# Patient Record
Sex: FEMALE | Race: BLACK OR AFRICAN AMERICAN | NOT HISPANIC OR LATINO | Employment: FULL TIME | URBAN - METROPOLITAN AREA
[De-identification: names, ages, dates, MRNs, and addresses within clinical notes are randomized per-mention and may not be internally consistent; named-entity substitution may affect disease eponyms.]

---

## 2018-05-24 DIAGNOSIS — E03.9 HYPOTHYROIDISM, UNSPECIFIED TYPE: Primary | ICD-10-CM

## 2018-05-24 RX ORDER — LEVOTHYROXINE SODIUM 88 UG/1
88 TABLET ORAL DAILY
Qty: 30 TABLET | Refills: 2 | Status: SHIPPED | OUTPATIENT
Start: 2018-05-24 | End: 2018-08-16 | Stop reason: SDUPTHER

## 2018-08-16 DIAGNOSIS — E03.9 HYPOTHYROIDISM, UNSPECIFIED TYPE: ICD-10-CM

## 2018-08-16 RX ORDER — LEVOTHYROXINE SODIUM 88 UG/1
TABLET ORAL
Qty: 30 TABLET | Refills: 2 | Status: SHIPPED | OUTPATIENT
Start: 2018-08-16 | End: 2018-11-05 | Stop reason: SDUPTHER

## 2018-11-05 DIAGNOSIS — E03.9 HYPOTHYROIDISM, UNSPECIFIED TYPE: ICD-10-CM

## 2018-11-05 RX ORDER — LEVOTHYROXINE SODIUM 88 UG/1
TABLET ORAL
Qty: 30 TABLET | Refills: 2 | Status: SHIPPED | OUTPATIENT
Start: 2018-11-05 | End: 2019-01-25 | Stop reason: SDUPTHER

## 2019-01-25 ENCOUNTER — OFFICE VISIT (OUTPATIENT)
Dept: ENDOCRINOLOGY | Facility: HOSPITAL | Age: 59
End: 2019-01-25
Payer: COMMERCIAL

## 2019-01-25 VITALS
BODY MASS INDEX: 30.91 KG/M2 | HEIGHT: 62 IN | HEART RATE: 88 BPM | WEIGHT: 168 LBS | SYSTOLIC BLOOD PRESSURE: 118 MMHG | DIASTOLIC BLOOD PRESSURE: 72 MMHG

## 2019-01-25 DIAGNOSIS — E04.2 MULTIPLE THYROID NODULES: ICD-10-CM

## 2019-01-25 DIAGNOSIS — E03.9 HYPOTHYROIDISM, UNSPECIFIED TYPE: ICD-10-CM

## 2019-01-25 DIAGNOSIS — E89.2 STATUS POST PARATHYROIDECTOMY (HCC): ICD-10-CM

## 2019-01-25 DIAGNOSIS — E03.9 ACQUIRED HYPOTHYROIDISM: Primary | ICD-10-CM

## 2019-01-25 DIAGNOSIS — Z86.39 H/O HYPERPARATHYROIDISM: ICD-10-CM

## 2019-01-25 PROCEDURE — 99215 OFFICE O/P EST HI 40 MIN: CPT | Performed by: INTERNAL MEDICINE

## 2019-01-25 RX ORDER — VITAMIN B COMPLEX
1000 TABLET ORAL DAILY
COMMUNITY

## 2019-01-25 RX ORDER — ZOLPIDEM TARTRATE 10 MG/1
10 TABLET ORAL
COMMUNITY

## 2019-01-25 RX ORDER — PANTOPRAZOLE SODIUM 40 MG/1
40 TABLET, DELAYED RELEASE ORAL DAILY
COMMUNITY

## 2019-01-25 RX ORDER — LEVOTHYROXINE SODIUM 88 UG/1
88 TABLET ORAL DAILY
Qty: 30 TABLET | Refills: 11 | Status: SHIPPED | OUTPATIENT
Start: 2019-01-25 | End: 2020-01-28 | Stop reason: SDUPTHER

## 2019-01-25 RX ORDER — ALUMINUM ZIRCONIUM OCTACHLOROHYDREX GLY 16 G/100G
1 GEL TOPICAL DAILY
COMMUNITY

## 2019-01-25 NOTE — PROGRESS NOTES
1/25/2019    Assessment/Plan      Diagnoses and all orders for this visit:    Acquired hypothyroidism  -     TSH, 3rd generation Lab Collect; Future  -     T4, free Lab Collect; Future  -     Thyroid Antibodies Panel Lab Collect; Future    Multiple thyroid nodules  -     TSH, 3rd generation Lab Collect; Future  -     T4, free Lab Collect; Future  -     Thyroid Antibodies Panel Lab Collect; Future    H/O hyperparathyroidism  -     Phosphorus Lab Collect; Future  -     Comprehensive metabolic panel Lab Collect; Future    Status post parathyroidectomy [facility])  -     Phosphorus Lab Collect; Future  -     Comprehensive metabolic panel Lab Collect; Future    Hypothyroidism, unspecified type  -     levothyroxine 88 mcg tablet; Take 1 tablet (88 mcg total) by mouth daily    Other orders  -     pantoprazole (PROTONIX) 40 mg tablet; Take 40 mg by mouth daily  -     zolpidem (AMBIEN) 10 mg tablet; Take 10 mg by mouth daily at bedtime as needed for sleep  -     Cholecalciferol (VITAMIN D) 2000 units tablet; Take 2,000 Units by mouth daily  -     bifidobacterium infantis (ALIGN) capsule; Take 1 capsule by mouth daily        Assessment/Plan:  1  Hypothyroidism  Most recent thyroid function tests are normal   I would have her continue the same levothyroxine 88 mcg daily  She asked if she could discontinue it since she is having some sleeping problems  I reassured her that I do not think her thyroid is causing any sleeping abnormalities with normal thyroid blood work and that she should not stop her thyroid hormone  2  Multiple small thyroid nodules on ultrasound  They are subcentimeter non worrisome  These will be followed over time  3  History of hyperparathyroidism post parathyroid adenoma removal   Most recent calcium levels are normal   She has not had a recurrence of the hyperparathyroidism  She will continue to take vitamin-D recur supplementation on a daily basis    I have asked her to follow up in 1 year with preceding CMP, phosphorus, TSH, free T4, and thyroid antibody panel  CC:  Hypothyroid and hyperparathyroid follow-up    History of Present Illness     HPI: Kameron Wallace is a 60y o  year old female with history of hypothyroidism with multiple small thyroid nodules and history of primary hyperparathyroidism post left parathyroid adenoma removal in the past   She was found to have primary hyperparathyroidism in 2014 and during the workup a parathyroid adenoma in the left lower lobe was discovered and a DEXA bone scan demonstrated normal bone mineral density  She underwent parathyroid adenoma removal and calcium has been normal since  She had had significant confusion when the calcium was high and that had improved  She continued to be fatigued and was subsequently found to have hypothyroidism around 6702-8562  She was started on thyroid hormone  She is on levothyroxine 88 mcg daily  She is always somewhat cold  She denies heat intolerance or tremors  She will get heart racing when she is very fatigued and does feel fatigued a lot  She denies diarrhea or constipation  She denies anxiety or depression  She tosses and turns at night has insomnia and often only gets several hours of sleep despite Ambien usage  Weight has been stable  She has winter dry skin, but no brittle nails or hair loss  She has no history of head or neck irradiation in the past   She has no diplopia or difficulties with swallowing  She denies polyuria or polydipsia  She has no perioral numbness or tingling or numbness or tingling of the extremities tremor knees  She has no muscle twitches or spasms  She does have weakness in her right shoulder or arm  She cannot lift her right shoulder up or extended above her head utilizing its own muscle power  She actually has to lift it with her left arm  She is due to see an orthopedist within the next week for evaluation      Review of Systems   Constitutional: Positive for fatigue  Negative for unexpected weight change  Will get very fatigued and heart races  HENT: Negative for hearing loss, tinnitus and trouble swallowing  Eyes: Negative for visual disturbance  No diplopia  Wears glasses  Has dry eyes  Respiratory: Positive for shortness of breath  Negative for chest tightness  SOB and heart racing going up the steps  Cardiovascular: Positive for palpitations  Negative for chest pain and leg swelling  Gastrointestinal: Positive for abdominal distention and abdominal pain  Negative for constipation, diarrhea and nausea  Has heartburn and reflux symptoms  Endocrine: Positive for cold intolerance  Negative for heat intolerance, polydipsia and polyuria  Always cold  Musculoskeletal: Positive for arthralgias  Negative for back pain  Can only lift right arm only slightly and not above shoulder  No pain, just can't lift it  Baker's cyst posterior left knee  Skin: Negative for rash  Has dry skin in the winter  No brittle nails  No hair loss  Neurological: Negative for dizziness, tremors, light-headedness, numbness and headaches  No perioral paresthesia  Psychiatric/Behavioral: Positive for sleep disturbance  Negative for confusion and dysphoric mood  The patient is not nervous/anxious  Has insomnia, only sleeps 2 hours  Will toss and turn if off ambien         Historical Information   Past Medical History:   Diagnosis Date    GERD (gastroesophageal reflux disease)      Past Surgical History:   Procedure Laterality Date     SECTION      EYE SURGERY      left 3 times and right 1 time for lasar surggery in corners for dry eye    PARATHYROIDECTOMY / EXPLORATION OF PARATHYROIDS       Social History   History   Alcohol Use    Yes     Comment: occasional     History   Drug Use No     History   Smoking Status    Never Smoker   Smokeless Tobacco    Never Used     Family History:   Family History Problem Relation Age of Onset    Hypertension Mother     Stroke Father     Leukemia Brother     Hypertension Sister     No Known Problems Brother     No Known Problems Daughter     No Known Problems Sister     No Known Problems Sister     No Known Problems Sister     Depression Sister        Meds/Allergies   Current Outpatient Prescriptions   Medication Sig Dispense Refill    bifidobacterium infantis (ALIGN) capsule Take 1 capsule by mouth daily      Cholecalciferol (VITAMIN D) 2000 units tablet Take 2,000 Units by mouth daily      levothyroxine 88 mcg tablet Take 1 tablet (88 mcg total) by mouth daily 30 tablet 11    pantoprazole (PROTONIX) 40 mg tablet Take 40 mg by mouth daily      zolpidem (AMBIEN) 10 mg tablet Take 10 mg by mouth daily at bedtime as needed for sleep       No current facility-administered medications for this visit  Allergies   Allergen Reactions    Milk-Related Compounds Other (See Comments)     Acid reflux and abdominal  Bloating   Penicillins Other (See Comments)     Not given due to sister's bad reaction       Objective   Vitals: Blood pressure 118/72, pulse 88, height 5' 2" (1 575 m), weight 76 2 kg (168 lb)  Invasive Devices          No matching active lines, drains, or airways          Physical Exam   Constitutional: She is oriented to person, place, and time  She appears well-developed and well-nourished  HENT:   Head: Normocephalic and atraumatic  Negative Chvostek sign  Eyes: Pupils are equal, round, and reactive to light  Conjunctivae and EOM are normal    No lid lag, stare, proptosis, or periorbital edema  Neck: Normal range of motion  Neck supple  No thyromegaly present  Healed anterior neck scar  No palpable thyroid nodules  No palpable masses  No bruits over the thyroid gland or carotids  Cardiovascular: Normal rate, regular rhythm, normal heart sounds and intact distal pulses  No murmur heard    Pulmonary/Chest: Effort normal and breath sounds normal  She has no wheezes  Abdominal: Soft  Bowel sounds are normal  There is no tenderness  Musculoskeletal: She exhibits no edema or deformity  Can't raise right arm up on own  Can lift it with the help of the left arm  No tremor of the outstretched hands  No spinous process tenderness  No CVA tenderness  Lymphadenopathy:     She has no cervical adenopathy  Neurological: She is alert and oriented to person, place, and time  She has normal reflexes  Skin: Skin is warm and dry  No rash noted  Vitals reviewed  The history was obtained from the review of the chart, [facility] endocrinology notes, and from the patient  Lab Results:   Blood work done at [facility] on 01/02/2019 demonstrates a CMP with a glucose of 93, BUN 11, creatinine 0 89, which GFR 71  Calcium was 9 4 with an albumin of 4 5  Phosphorus was 3 6  TSH is 0 676 with a free T4 of 1 77  Thyroid ultrasound done a [facility] on 06/28/2018 demonstrates the right lobe of the thyroid measuring 2 9 x 1 4 x 1 1 cm in the left lobe of the thyroid measuring 2 7 x 1 5 x 1 1 cm  The gland is at atrophic and inhomogeneous in echogenicity with a 5 mm solid nodule in the upper pole of the left lobe and a 5 mm nodule in the inferior posterior pole of the left lobe along with an 8 mm nodule in the thyroid isthmus  No results found for this or any previous visit (from the past 85933 hour(s))        Future Appointments  Date Time Provider Francisco Temple   1/28/2020 9:20 AM Chaparrita Aguayo MD [facility]

## 2019-01-25 NOTE — LETTER
January 25, 2019     Zaire Montalvo MD  [address] [facility]    Patient: Jimmy Landa   YOB: 1960   Date of Visit: 1/25/2019       Dear Dr Rajni Butler:    Thank you for referring Jimmy Landa to me for evaluation  Below are my notes for this consultation  If you have questions, please do not hesitate to call me  I look forward to following your patient along with you  Sincerely,        Donato Vela MD        CC: No Recipients  Donato Vela MD  1/25/2019 12:40 PM  Sign at close encounter  1/25/2019    Assessment/Plan      Diagnoses and all orders for this visit:    Acquired hypothyroidism  -     TSH, 3rd generation Lab Collect; Future  -     T4, free Lab Collect; Future  -     Thyroid Antibodies Panel Lab Collect; Future    Multiple thyroid nodules  -     TSH, 3rd generation Lab Collect; Future  -     T4, free Lab Collect; Future  -     Thyroid Antibodies Panel Lab Collect; Future    H/O hyperparathyroidism  -     Phosphorus Lab Collect; Future  -     Comprehensive metabolic panel Lab Collect; Future    Status post parathyroidectomy [facility])  -     Phosphorus Lab Collect; Future  -     Comprehensive metabolic panel Lab Collect; Future    Hypothyroidism, unspecified type  -     levothyroxine 88 mcg tablet; Take 1 tablet (88 mcg total) by mouth daily    Other orders  -     pantoprazole (PROTONIX) 40 mg tablet; Take 40 mg by mouth daily  -     zolpidem (AMBIEN) 10 mg tablet; Take 10 mg by mouth daily at bedtime as needed for sleep  -     Cholecalciferol (VITAMIN D) 2000 units tablet; Take 2,000 Units by mouth daily  -     bifidobacterium infantis (ALIGN) capsule; Take 1 capsule by mouth daily        Assessment/Plan:  1  Hypothyroidism  Most recent thyroid function tests are normal   I would have her continue the same levothyroxine 88 mcg daily  She asked if she could discontinue it since she is having some sleeping problems    I reassured her that I do not think her thyroid is causing any sleeping abnormalities with normal thyroid blood work and that she should not stop her thyroid hormone  2  Multiple small thyroid nodules on ultrasound  They are subcentimeter non worrisome  These will be followed over time  3  History of hyperparathyroidism post parathyroid adenoma removal   Most recent calcium levels are normal   She has not had a recurrence of the hyperparathyroidism  She will continue to take vitamin-D recur supplementation on a daily basis  I have asked her to follow up in 1 year with preceding CMP, phosphorus, TSH, free T4, and thyroid antibody panel  CC:  Hypothyroid and hyperparathyroid follow-up    History of Present Illness     HPI: Kameron Wallace is a 60y o  year old female with history of hypothyroidism with multiple small thyroid nodules and history of primary hyperparathyroidism post left parathyroid adenoma removal in the past   She was found to have primary hyperparathyroidism in 2014 and during the workup a parathyroid adenoma in the left lower lobe was discovered and a DEXA bone scan demonstrated normal bone mineral density  She underwent parathyroid adenoma removal and calcium has been normal since  She had had significant confusion when the calcium was high and that had improved  She continued to be fatigued and was subsequently found to have hypothyroidism around 1570-2260  She was started on thyroid hormone  She is on levothyroxine 88 mcg daily  She is always somewhat cold  She denies heat intolerance or tremors  She will get heart racing when she is very fatigued and does feel fatigued a lot  She denies diarrhea or constipation  She denies anxiety or depression  She tosses and turns at night has insomnia and often only gets several hours of sleep despite Ambien usage  Weight has been stable  She has winter dry skin, but no brittle nails or hair loss    She has no history of head or neck irradiation in the past   She has no diplopia or difficulties with swallowing  She denies polyuria or polydipsia  She has no perioral numbness or tingling or numbness or tingling of the extremities tremor knees  She has no muscle twitches or spasms  She does have weakness in her right shoulder or arm  She cannot lift her right shoulder up or extended above her head utilizing its own muscle power  She actually has to lift it with her left arm  She is due to see an orthopedist within the next week for evaluation  Review of Systems   Constitutional: Positive for fatigue  Negative for unexpected weight change  Will get very fatigued and heart races  HENT: Negative for hearing loss, tinnitus and trouble swallowing  Eyes: Negative for visual disturbance  No diplopia  Wears glasses  Has dry eyes  Respiratory: Positive for shortness of breath  Negative for chest tightness  SOB and heart racing going up the steps  Cardiovascular: Positive for palpitations  Negative for chest pain and leg swelling  Gastrointestinal: Positive for abdominal distention and abdominal pain  Negative for constipation, diarrhea and nausea  Has heartburn and reflux symptoms  Endocrine: Positive for cold intolerance  Negative for heat intolerance, polydipsia and polyuria  Always cold  Musculoskeletal: Positive for arthralgias  Negative for back pain  Can only lift right arm only slightly and not above shoulder  No pain, just can't lift it  Baker's cyst posterior left knee  Skin: Negative for rash  Has dry skin in the winter  No brittle nails  No hair loss  Neurological: Negative for dizziness, tremors, light-headedness, numbness and headaches  No perioral paresthesia  Psychiatric/Behavioral: Positive for sleep disturbance  Negative for confusion and dysphoric mood  The patient is not nervous/anxious  Has insomnia, only sleeps 2 hours  Will toss and turn if off ambien  Historical Information   Past Medical History:   Diagnosis Date    GERD (gastroesophageal reflux disease)      Past Surgical History:   Procedure Laterality Date     SECTION      EYE SURGERY      left 3 times and right 1 time for lasar surggery in corners for dry eye    PARATHYROIDECTOMY / EXPLORATION OF PARATHYROIDS       Social History   History   Alcohol Use    Yes     Comment: occasional     History   Drug Use No     History   Smoking Status    Never Smoker   Smokeless Tobacco    Never Used     Family History:   Family History   Problem Relation Age of Onset    Hypertension Mother     Stroke Father     Leukemia Brother     Hypertension Sister     No Known Problems Brother     No Known Problems Daughter     No Known Problems Sister     No Known Problems Sister     No Known Problems Sister     Depression Sister        Meds/Allergies   Current Outpatient Prescriptions   Medication Sig Dispense Refill    bifidobacterium infantis (ALIGN) capsule Take 1 capsule by mouth daily      Cholecalciferol (VITAMIN D) 2000 units tablet Take 2,000 Units by mouth daily      levothyroxine 88 mcg tablet Take 1 tablet (88 mcg total) by mouth daily 30 tablet 11    pantoprazole (PROTONIX) 40 mg tablet Take 40 mg by mouth daily      zolpidem (AMBIEN) 10 mg tablet Take 10 mg by mouth daily at bedtime as needed for sleep       No current facility-administered medications for this visit  Allergies   Allergen Reactions    Milk-Related Compounds Other (See Comments)     Acid reflux and abdominal  Bloating   Penicillins Other (See Comments)     Not given due to sister's bad reaction       Objective   Vitals: Blood pressure 118/72, pulse 88, height 5' 2" (1 575 m), weight 76 2 kg (168 lb)  Invasive Devices          No matching active lines, drains, or airways          Physical Exam   Constitutional: She is oriented to person, place, and time  She appears well-developed and well-nourished     HENT: Head: Normocephalic and atraumatic  Negative Chvostek sign  Eyes: Pupils are equal, round, and reactive to light  Conjunctivae and EOM are normal    No lid lag, stare, proptosis, or periorbital edema  Neck: Normal range of motion  Neck supple  No thyromegaly present  Healed anterior neck scar  No palpable thyroid nodules  No palpable masses  No bruits over the thyroid gland or carotids  Cardiovascular: Normal rate, regular rhythm, normal heart sounds and intact distal pulses  No murmur heard  Pulmonary/Chest: Effort normal and breath sounds normal  She has no wheezes  Abdominal: Soft  Bowel sounds are normal  There is no tenderness  Musculoskeletal: She exhibits no edema or deformity  Can't raise right arm up on own  Can lift it with the help of the left arm  No tremor of the outstretched hands  No spinous process tenderness  No CVA tenderness  Lymphadenopathy:     She has no cervical adenopathy  Neurological: She is alert and oriented to person, place, and time  She has normal reflexes  Skin: Skin is warm and dry  No rash noted  Vitals reviewed  The history was obtained from the review of the chart, [facility] endocrinology notes, and from the patient  Lab Results:   Blood work done at [address] [place] [address] on 01/02/2019 demonstrates a CMP with a glucose of 93, BUN 11, creatinine 0 89, which GFR 71  Calcium was 9 4 with an albumin of 4 5  Phosphorus was 3 6  TSH is 0 676 with a free T4 of 1 77  Thyroid ultrasound done a [facility] on 06/28/2018 demonstrates the right lobe of the thyroid measuring 2 9 x 1 4 x 1 1 cm in the left lobe of the thyroid measuring 2 7 x 1 5 x 1 1 cm  The gland is at atrophic and inhomogeneous in echogenicity with a 5 mm solid nodule in the upper pole of the left lobe and a 5 mm nodule in the inferior posterior pole of the left lobe along with an 8 mm nodule in the thyroid isthmus          No results found for this or any previous visit (from the past 21234 hour(s))        Future Appointments  Date Time Provider Francisco Temple   1/28/2020 9:20 AM Kassidy Romero MD [facility]

## 2019-01-25 NOTE — PATIENT INSTRUCTIONS
The thyroid blood work is normal   Continue the same Levothyroxine 88 mcg daily  Do  Not stop the medicine  The thyroid ultrasound is stable  You have 3 tiny nodules in thyroid  These are nonworrisome  Follow up in 1 year with blood work

## 2020-01-04 LAB
ALBUMIN SERPL-MCNC: 4.2 G/DL (ref 3.6–4.8)
ALBUMIN/GLOB SERPL: 1.8 {RATIO} (ref 1.2–2.2)
ALP SERPL-CCNC: 76 IU/L (ref 39–117)
ALT SERPL-CCNC: 15 IU/L (ref 0–32)
AST SERPL-CCNC: 19 IU/L (ref 0–40)
BILIRUB SERPL-MCNC: 0.3 MG/DL (ref 0–1.2)
BUN SERPL-MCNC: 14 MG/DL (ref 8–27)
BUN/CREAT SERPL: 15 (ref 12–28)
CALCIUM SERPL-MCNC: 9.5 MG/DL (ref 8.7–10.3)
CHLORIDE SERPL-SCNC: 104 MMOL/L (ref 96–106)
CO2 SERPL-SCNC: 25 MMOL/L (ref 20–29)
CREAT SERPL-MCNC: 0.95 MG/DL (ref 0.57–1)
GLOBULIN SER-MCNC: 2.4 G/DL (ref 1.5–4.5)
GLUCOSE SERPL-MCNC: 85 MG/DL (ref 65–99)
PHOSPHATE SERPL-MCNC: 3.9 MG/DL (ref 2.5–4.5)
POTASSIUM SERPL-SCNC: 4 MMOL/L (ref 3.5–5.2)
PROT SERPL-MCNC: 6.6 G/DL (ref 6–8.5)
SL AMB EGFR AFRICAN AMERICAN: 75 ML/MIN/1.73
SL AMB EGFR NON AFRICAN AMERICAN: 65 ML/MIN/1.73
SODIUM SERPL-SCNC: 143 MMOL/L (ref 134–144)
T4 FREE SERPL-MCNC: 1.64 NG/DL (ref 0.82–1.77)
THYROGLOB AB SERPL-ACNC: <1 IU/ML (ref 0–0.9)
THYROPEROXIDASE AB SERPL-ACNC: 7 IU/ML (ref 0–34)
TSH SERPL DL<=0.005 MIU/L-ACNC: 1.52 UIU/ML (ref 0.45–4.5)

## 2020-01-28 ENCOUNTER — OFFICE VISIT (OUTPATIENT)
Dept: ENDOCRINOLOGY | Facility: HOSPITAL | Age: 60
End: 2020-01-28
Payer: COMMERCIAL

## 2020-01-28 VITALS
HEIGHT: 62 IN | DIASTOLIC BLOOD PRESSURE: 82 MMHG | BODY MASS INDEX: 30.73 KG/M2 | WEIGHT: 167 LBS | HEART RATE: 78 BPM | SYSTOLIC BLOOD PRESSURE: 120 MMHG

## 2020-01-28 DIAGNOSIS — E04.2 MULTIPLE THYROID NODULES: ICD-10-CM

## 2020-01-28 DIAGNOSIS — E03.9 ACQUIRED HYPOTHYROIDISM: Primary | ICD-10-CM

## 2020-01-28 DIAGNOSIS — E03.9 HYPOTHYROIDISM, UNSPECIFIED TYPE: ICD-10-CM

## 2020-01-28 DIAGNOSIS — Z86.39 H/O HYPERPARATHYROIDISM: ICD-10-CM

## 2020-01-28 DIAGNOSIS — E89.2 STATUS POST PARATHYROIDECTOMY (HCC): ICD-10-CM

## 2020-01-28 PROCEDURE — 99214 OFFICE O/P EST MOD 30 MIN: CPT | Performed by: INTERNAL MEDICINE

## 2020-01-28 RX ORDER — LEVOTHYROXINE SODIUM 88 UG/1
88 TABLET ORAL DAILY
Qty: 30 TABLET | Refills: 11 | Status: SHIPPED | OUTPATIENT
Start: 2020-01-28 | End: 2021-02-08

## 2020-01-28 NOTE — PROGRESS NOTES
1/29/2020    Assessment/Plan      Diagnoses and all orders for this visit:    Acquired hypothyroidism  -     TSH, 3rd generation Lab Collect; Future  -     T4, free Lab Collect; Future  -     Phosphorus Lab Collect; Future  -     Comprehensive metabolic panel Lab Collect; Future  -     T3, free; Future    Multiple thyroid nodules  -     TSH, 3rd generation Lab Collect; Future  -     T4, free Lab Collect; Future  -     Phosphorus Lab Collect; Future  -     Comprehensive metabolic panel Lab Collect; Future  -     T3, free; Future    H/O hyperparathyroidism  -     TSH, 3rd generation Lab Collect; Future  -     T4, free Lab Collect; Future  -     Phosphorus Lab Collect; Future  -     Comprehensive metabolic panel Lab Collect; Future  -     T3, free; Future    Status post parathyroidectomy  -     TSH, 3rd generation Lab Collect; Future  -     T4, free Lab Collect; Future  -     Phosphorus Lab Collect; Future  -     Comprehensive metabolic panel Lab Collect; Future  -     T3, free; Future    Hypothyroidism, unspecified type  -     levothyroxine 88 mcg tablet; Take 1 tablet (88 mcg total) by mouth daily        Assessment/Plan:  1  Hypothyroidism  Most recent thyroid function tests are normal   She is both biochemically and clinically euthyroid  She will continue the same levothyroxine 88 mcg daily  2  Multiple thyroid nodules  Last thyroid ultrasound in 2018 showed very small subcentimeter non worrisome thyroid nodules  At this point, no repeat thyroid ultrasounds need to be done  3  Hyperparathyroidism post parathyroid adenectomy  Most recent calcium level is normal   She will continue to drink plenty of water  She will continue the same vitamin-D replacement  I have asked her to follow up in 1 year with preceding TSH, free T4, free T3, CMP, and phosphorus        CC:  Hypothyroid and parathyroid follow-up    History of Present Illness     HPI: Linda Vigil is a 60y o  year old female with history of hypothyroidism with multiple small thyroid nodules and history of primary hyperparathyroidism post left parathyroid adenoma removal here for follow-up  She was found have primary hyperparathyroidism in 2014 and during the workup, a left parathyroid adenoma was discovered and she underwent removal with normalization of calcium  She continued to be fatigued postoperatively despite the improvement in confusion and was subsequently found to have hypothyroidism around 7040-1913 and started on thyroid hormone  Of note, DEXA scan was normal in the workup of parathyroid disease  She has polyuria but no polydipsia  She has nocturia 3 times a night  She has some fatigue  She has no mental confusion  She has some constipation  She is currently taking levothyroxine 88 mcg daily for her hypothyroidism  She denies heat or cold intolerance, palpitation, or tremors  Weight is been stable  She has some fatigue  She admits to sleeping poorly and using Ambien is bit more anxious  She denies depression  She has some constipation but no diarrhea  She has some winter dry skin and chronic brittle nails and hair loss  She has no diplopia  She has no compressive thyroid symptoms or difficulties with swallowing  She has been found to have multiple small thyroid nodules on ultrasound  They have been subcentimeter and no biopsy has needed to be done  Last thyroid ultrasound was 2018  She has no compressive thyroid symptoms  Review of Systems   Constitutional: Positive for fatigue  Negative for unexpected weight change  HENT: Negative for trouble swallowing  Eyes: Negative for visual disturbance  No diplopia  Wears glasses  Has high IOP and being followed by eye doctor  Respiratory: Negative for chest tightness and shortness of breath  Cardiovascular: Negative for chest pain and palpitations  Gastrointestinal: Positive for constipation  Negative for abdominal pain, diarrhea and nausea          Some GERD symptoms at night with burning in throat  Endocrine: Positive for polyuria  Negative for cold intolerance, heat intolerance and polydipsia  Rare hot flash  Nocturia 3 times a night  Musculoskeletal: Positive for back pain  Some low back pain chronic  Skin: Negative for rash  Some winter dry skin  Has chronic brittle nails and hair loss  Neurological: Positive for dizziness and headaches  Negative for tremors, facial asymmetry, light-headedness and numbness  Occasional dizzy  Some headaches  No perioral paresthesias  Psychiatric/Behavioral: Positive for sleep disturbance  Negative for confusion and dysphoric mood  The patient is nervous/anxious  Sleeping poor, uses Ambien  Some anxiety         Historical Information   Past Medical History:   Diagnosis Date    GERD (gastroesophageal reflux disease)      Past Surgical History:   Procedure Laterality Date     SECTION      EYE SURGERY      left 3 times and right 1 time for lasar surggery in corners for dry eye    PARATHYROIDECTOMY / EXPLORATION OF PARATHYROIDS       Social History   Social History     Substance and Sexual Activity   Alcohol Use Yes    Comment: occasional     Social History     Substance and Sexual Activity   Drug Use No     Social History     Tobacco Use   Smoking Status Never Smoker   Smokeless Tobacco Never Used     Family History:   Family History   Problem Relation Age of Onset    Hypertension Mother     Stroke Father     Leukemia Brother     Hypertension Sister     No Known Problems Brother     No Known Problems Daughter     No Known Problems Sister     No Known Problems Sister     No Known Problems Sister     Depression Sister        Meds/Allergies   Current Outpatient Medications   Medication Sig Dispense Refill    bifidobacterium infantis (ALIGN) capsule Take 1 capsule by mouth daily prn      Cholecalciferol (VITAMIN D) 2000 units tablet Take 2,000 Units by mouth daily      levothyroxine 88 mcg tablet Take 1 tablet (88 mcg total) by mouth daily 30 tablet 11    pantoprazole (PROTONIX) 40 mg tablet Take 40 mg by mouth daily      zolpidem (AMBIEN) 10 mg tablet Take 10 mg by mouth daily at bedtime as needed for sleep       No current facility-administered medications for this visit  Allergies   Allergen Reactions    Milk-Related Compounds Other (See Comments)     Acid reflux and abdominal  Bloating   Penicillins Other (See Comments)     Not given due to sister's bad reaction       Objective   Vitals: Blood pressure 120/82, pulse 78, height 5' 2" (1 575 m), weight 75 8 kg (167 lb)  Invasive Devices     None                 Physical Exam   Constitutional: She is oriented to person, place, and time  She appears well-developed and well-nourished  HENT:   Head: Normocephalic and atraumatic  Negative Chvostek sign  Eyes: Pupils are equal, round, and reactive to light  Conjunctivae and EOM are normal    No lid lag, stare, proptosis, or periorbital edema  Neck: Normal range of motion  Neck supple  No thyromegaly present  Healed anterior neck scar  Thyroid normal in size without palpable thyroid nodules  No masses of the neck  No carotid bruits  Cardiovascular: Normal rate, regular rhythm and normal heart sounds  No murmur heard  Pulmonary/Chest: Effort normal and breath sounds normal  She has no wheezes  Abdominal: Soft  There is no tenderness  Musculoskeletal: Normal range of motion  She exhibits no edema or deformity  No tremor of the outstretched hands  No CVA tenderness  No spinous process tenderness  Lymphadenopathy:     She has no cervical adenopathy  Neurological: She is alert and oriented to person, place, and time  She has normal reflexes  Deep tendon reflexes normal    Skin: Skin is warm and dry  No rash noted  Vitals reviewed  The history was obtained from the review of the chart and from the patient      Lab Results:      CMP done on 01/03/2020 showed a glucose of 85 fasting, calcium of 9 5 with an albumin of 4 2  Phosphorus is 3 9      Lab Results   Component Value Date    CREATININE 0 95 01/03/2020    BUN 14 01/03/2020    K 4 0 01/03/2020     01/03/2020    CO2 25 01/03/2020     Lab Results   Component Value Date    ALT 15 01/03/2020    AST 19 01/03/2020     Lab Results   Component Value Date    TSH 1 520 01/03/2020    FREET4 1 64 01/03/2020     Future Appointments   Date Time Provider Francisco Temple   1/29/2021  9:40 AM Víctor Renner, [address] [facility]

## 2020-01-28 NOTE — PATIENT INSTRUCTIONS
The thyroid blood work is normal    Continue the same levothyroxine 88 mcg daily  The calcium is normal    Continue to increase water intake  Follow up in 1 year with blood work

## 2021-01-09 LAB
ALBUMIN SERPL-MCNC: 4.2 G/DL (ref 3.8–4.8)
ALBUMIN/GLOB SERPL: 1.8 {RATIO} (ref 1.2–2.2)
ALP SERPL-CCNC: 89 IU/L (ref 39–117)
ALT SERPL-CCNC: 14 IU/L (ref 0–32)
AST SERPL-CCNC: 22 IU/L (ref 0–40)
BILIRUB SERPL-MCNC: 0.2 MG/DL (ref 0–1.2)
BUN SERPL-MCNC: 16 MG/DL (ref 8–27)
BUN/CREAT SERPL: 18 (ref 12–28)
CALCIUM SERPL-MCNC: 9.6 MG/DL (ref 8.7–10.3)
CHLORIDE SERPL-SCNC: 105 MMOL/L (ref 96–106)
CO2 SERPL-SCNC: 25 MMOL/L (ref 20–29)
CREAT SERPL-MCNC: 0.9 MG/DL (ref 0.57–1)
GLOBULIN SER-MCNC: 2.3 G/DL (ref 1.5–4.5)
GLUCOSE SERPL-MCNC: 90 MG/DL (ref 65–99)
PHOSPHATE SERPL-MCNC: 3.9 MG/DL (ref 3–4.3)
POTASSIUM SERPL-SCNC: 4.1 MMOL/L (ref 3.5–5.2)
PROT SERPL-MCNC: 6.5 G/DL (ref 6–8.5)
SL AMB EGFR AFRICAN AMERICAN: 80 ML/MIN/1.73
SL AMB EGFR NON AFRICAN AMERICAN: 69 ML/MIN/1.73
SODIUM SERPL-SCNC: 141 MMOL/L (ref 134–144)
T3FREE SERPL-MCNC: 2.8 PG/ML (ref 2–4.4)
T4 FREE SERPL-MCNC: 1.54 NG/DL (ref 0.82–1.77)
TSH SERPL DL<=0.005 MIU/L-ACNC: 1.21 UIU/ML (ref 0.45–4.5)

## 2021-02-06 DIAGNOSIS — E03.9 HYPOTHYROIDISM, UNSPECIFIED TYPE: ICD-10-CM

## 2021-02-08 ENCOUNTER — OFFICE VISIT (OUTPATIENT)
Dept: ENDOCRINOLOGY | Facility: HOSPITAL | Age: 61
End: 2021-02-08
Payer: COMMERCIAL

## 2021-02-08 VITALS
DIASTOLIC BLOOD PRESSURE: 66 MMHG | SYSTOLIC BLOOD PRESSURE: 104 MMHG | WEIGHT: 167 LBS | HEIGHT: 62 IN | BODY MASS INDEX: 30.73 KG/M2 | HEART RATE: 72 BPM

## 2021-02-08 DIAGNOSIS — E04.2 MULTIPLE THYROID NODULES: ICD-10-CM

## 2021-02-08 DIAGNOSIS — Z86.39 H/O HYPERPARATHYROIDISM: ICD-10-CM

## 2021-02-08 DIAGNOSIS — E03.9 ACQUIRED HYPOTHYROIDISM: Primary | ICD-10-CM

## 2021-02-08 DIAGNOSIS — E89.2 STATUS POST PARATHYROIDECTOMY (HCC): ICD-10-CM

## 2021-02-08 PROCEDURE — 99214 OFFICE O/P EST MOD 30 MIN: CPT | Performed by: INTERNAL MEDICINE

## 2021-02-08 RX ORDER — LEVOTHYROXINE SODIUM 88 UG/1
TABLET ORAL
Qty: 30 TABLET | Refills: 11 | Status: SHIPPED | OUTPATIENT
Start: 2021-02-08 | End: 2022-02-07

## 2021-02-08 NOTE — PROGRESS NOTES
2/8/2021    Assessment/Plan      Diagnoses and all orders for this visit:    Acquired hypothyroidism  -     Comprehensive metabolic panel Lab Collect; Future  -     CBC and differential Lab Collect; Future  -     TSH, 3rd generation Lab Collect; Future  -     T3 Lab Collect; Future  -     Phosphorus Lab Collect; Future    Multiple thyroid nodules  -     Comprehensive metabolic panel Lab Collect; Future  -     CBC and differential Lab Collect; Future  -     TSH, 3rd generation Lab Collect; Future  -     T3 Lab Collect; Future  -     Phosphorus Lab Collect; Future    Status post parathyroidectomy  -     Comprehensive metabolic panel Lab Collect; Future  -     CBC and differential Lab Collect; Future  -     TSH, 3rd generation Lab Collect; Future  -     T3 Lab Collect; Future  -     Phosphorus Lab Collect; Future    H/O hyperparathyroidism  -     Comprehensive metabolic panel Lab Collect; Future  -     CBC and differential Lab Collect; Future  -     TSH, 3rd generation Lab Collect; Future  -     T3 Lab Collect; Future  -     Phosphorus Lab Collect; Future        Assessment/Plan:   1  Hypothyroidism  Most recent thyroid function tests are normal   She is biochemically and clinically euthyroid  She will continue the same levothyroxine 88 mcg daily  She was asked if she could stop her levothyroxine dosage and I informed her that I do not think this is a good idea as her hypothyroidism would recur and she would have worsening symptoms  2  Multiple thyroid nodules  Her nodules are subcentimeter in size and do not need to be followed over time  She has no compressive thyroid symptoms  3  History of hyperparathyroidism post parathyroid adenoma removal   Most recent calcium is normal   I will follow her calcium levels over time    Her recent parathyroid hormone level is also normal       I have asked her to follow up in 1 year with preceding CMP, CBC, phosphorus, TSH, and free T4       CC:   Hypothyroid, thyroid nodule, parathyroid follow-up    History of Present Illness     HPI: Oj Sandoval is a 63y o  year old female with history of Hypothyroidism with multiple small thyroid nodules and primary hyperparathyroidism post left parathyroid adenoma removal for follow-up visit  She was found to have primary hyperparathyroidism in 2014  A left parathyroid adenoma was discovered during the workup and she underwent surgical removal of that parathyroid adenoma with normalization of calcium  She unfortunately had continued fatigue postoperatively and was found to have hypothyroidism in 2014 to 2015 and was started on thyroid hormone  Of note, DEXA scan was normal during the workup of her parathyroid disease  She denies polydipsia or polyuria  She denies confusion  She is always easily fatigued and has some constipation  She has hypothyroidism diagnosed around 1379-7382 and takes levothyroxine 88 mcg daily  She is always somewhat cold  She has continued unchanged fatigue  She denies tremors, heat intolerance, or weight changes  She tends to be on the constipated side  She can be anxious and stressed with work  She does have some sleeping irregularities because she only gets a short amount of time to sleep  She denies depression, diarrhea, or palpitations  She has unchanged brittle nails and some hair loss but no dry skin  She was found to have multiple small thyroid nodules on ultrasound  They were all subcentimeter and no biopsy was needed  Last thyroid ultrasound was 2018  There was no change in the nodules and no repeat thyroid  Ultrasounds need to be performed  She denies compressive thyroid symptoms or difficulties with swallowing  Review of Systems   Constitutional: Positive for fatigue  Negative for unexpected weight change  Easily fatigued  HENT: Negative for trouble swallowing  Eyes: Negative for visual disturbance  No diplopia     Respiratory: Negative for chest tightness and shortness of breath  Has to catch breath with exercise  Cardiovascular: Negative for chest pain and palpitations  Gastrointestinal: Positive for constipation  Negative for abdominal pain, diarrhea and nausea  Constipated at times, helped with probiotic And apple cider vinegar  Endocrine: Positive for cold intolerance  Negative for heat intolerance, polydipsia and polyuria  Nocturia 2-3 times a night  tends to be on the cold side  Musculoskeletal: Positive for arthralgias  Left shoulder and arm pain with movement, especially with raising arm  Skin: Negative for wound  No dry skin  Has brittle nails unchanged  Has hair loss  Neurological: Negative for dizziness, tremors, light-headedness and headaches  Psychiatric/Behavioral: Positive for sleep disturbance  Negative for dysphoric mood  The patient is nervous/anxious  Sleeping awakened every hour and can be awake for several hours  stress with job         Historical Information   Past Medical History:   Diagnosis Date    GERD (gastroesophageal reflux disease)      Past Surgical History:   Procedure Laterality Date     SECTION      EYE SURGERY      left 3 times and right 1 time for lasar surggery in corners for dry eye    PARATHYROIDECTOMY / EXPLORATION OF PARATHYROIDS       Social History   Social History     Substance and Sexual Activity   Alcohol Use Yes    Comment: occasional     Social History     Substance and Sexual Activity   Drug Use No     Social History     Tobacco Use   Smoking Status Never Smoker   Smokeless Tobacco Never Used     Family History:   Family History   Problem Relation Age of Onset    Hypertension Mother     Stroke Father     Leukemia Brother     Hypertension Sister     No Known Problems Brother     No Known Problems Daughter     No Known Problems Sister     No Known Problems Sister     No Known Problems Sister     Depression Sister Meds/Allergies   Current Outpatient Medications   Medication Sig Dispense Refill    bifidobacterium infantis (ALIGN) capsule Take 1 capsule by mouth daily prn      Cholecalciferol (VITAMIN D) 2000 units tablet Take 2,000 Units by mouth daily      pantoprazole (PROTONIX) 40 mg tablet Take 40 mg by mouth daily      zolpidem (AMBIEN) 10 mg tablet Take 10 mg by mouth daily at bedtime as needed for sleep      levothyroxine 88 mcg tablet TAKE 1 TABLET BY MOUTH EVERY DAY 30 tablet 11     No current facility-administered medications for this visit  Allergies   Allergen Reactions    Latex Itching     On contact     Milk-Related Compounds Other (See Comments)     Acid reflux and abdominal  Bloating   Penicillins Other (See Comments)     Not given due to sister's bad reaction       Objective   Vitals: Blood pressure 104/66, pulse 72, height 5' 2" (1 575 m), weight 75 8 kg (167 lb)  Invasive Devices     None                 Physical Exam  Vitals signs reviewed  Constitutional:       Appearance: Normal appearance  She is well-developed  HENT:      Head: Normocephalic and atraumatic  Eyes:      Extraocular Movements: Extraocular movements intact  Conjunctiva/sclera: Conjunctivae normal       Comments: No lid lag, stare, proptosis, or periorbital  edema  Neck:      Musculoskeletal: Normal range of motion and neck supple  Thyroid: No thyromegaly  Vascular: No carotid bruit  Comments: Healed anterior neck scar  No palpable thyroid nodules  No palpable masses  Thyroid normal in size  Cardiovascular:      Rate and Rhythm: Normal rate and regular rhythm  Heart sounds: Normal heart sounds  No murmur  Pulmonary:      Effort: Pulmonary effort is normal       Breath sounds: Normal breath sounds  No wheezing  Abdominal:      Palpations: Abdomen is soft  Tenderness: There is no abdominal tenderness  Musculoskeletal: Normal range of motion  General: No deformity  Right lower leg: No edema  Left lower leg: No edema  Comments: No tremor of the outstretched hands  No CVA tenderness  No spinous process tenderness  Lymphadenopathy:      Cervical: No cervical adenopathy  Skin:     General: Skin is warm and dry  Findings: No rash  Neurological:      Mental Status: She is alert and oriented to person, place, and time  Deep Tendon Reflexes: Reflexes are normal and symmetric  Comments: Deep tendon reflexes normal          The history was obtained from the review of the chart and from the patient  Lab Results:       Blood work done on 01/08/2021 demonstrates the following results:      CMP done demonstrates a calcium of 9 6 with an albumin of 4 2  Phosphorus is 3 9  Lab Results   Component Value Date    CREATININE 0 90 01/08/2021    CREATININE 0 95 01/03/2020    BUN 16 01/08/2021    K 4 1 01/08/2021     01/08/2021    CO2 25 01/08/2021         Lab Results   Component Value Date    ALT 14 01/08/2021    AST 22 01/08/2021       Lab Results   Component Value Date    TSH 1 210 01/08/2021    FREET4 1 54 01/08/2021       Free T3 is 2 8  Blood work done at [facility] on 02/04/2021 showed a CBC that was normal   Total cholesterol 179, triglyceride 55, HDL 74, LDL 94  Twenty-five hydroxy vitamin-D levels 32 1 and parathyroid hormone level intact was 46      Future Appointments   Date Time Provider Francisco Temple   2/14/2022  9:40 AM Mignon Mena, [address] [facility]

## 2021-02-08 NOTE — PATIENT INSTRUCTIONS
The blood work is stable  No change in levothyroxine 88 mcg daily  Follow up in 1 year with blood work

## 2021-11-30 ENCOUNTER — APPOINTMENT (OUTPATIENT)
Dept: RADIOLOGY | Facility: CLINIC | Age: 61
End: 2021-11-30
Payer: COMMERCIAL

## 2021-11-30 DIAGNOSIS — M25.572 LEFT ANKLE PAIN, UNSPECIFIED CHRONICITY: ICD-10-CM

## 2021-11-30 PROCEDURE — 73610 X-RAY EXAM OF ANKLE: CPT

## 2022-01-13 LAB
ALBUMIN SERPL-MCNC: 4.3 G/DL (ref 3.8–4.8)
ALBUMIN/GLOB SERPL: 1.7 {RATIO} (ref 1.2–2.2)
ALP SERPL-CCNC: 87 IU/L (ref 44–121)
ALT SERPL-CCNC: 14 IU/L (ref 0–32)
AST SERPL-CCNC: 16 IU/L (ref 0–40)
BASOPHILS # BLD AUTO: 0 X10E3/UL (ref 0–0.2)
BASOPHILS NFR BLD AUTO: 0 %
BILIRUB SERPL-MCNC: 0.2 MG/DL (ref 0–1.2)
BUN SERPL-MCNC: 14 MG/DL (ref 8–27)
BUN/CREAT SERPL: 15 (ref 12–28)
CALCIUM SERPL-MCNC: 9.6 MG/DL (ref 8.7–10.3)
CHLORIDE SERPL-SCNC: 103 MMOL/L (ref 96–106)
CO2 SERPL-SCNC: 28 MMOL/L (ref 20–29)
CREAT SERPL-MCNC: 0.93 MG/DL (ref 0.57–1)
EOSINOPHIL # BLD AUTO: 0 X10E3/UL (ref 0–0.4)
EOSINOPHIL NFR BLD AUTO: 1 %
ERYTHROCYTE [DISTWIDTH] IN BLOOD BY AUTOMATED COUNT: 13.1 % (ref 11.7–15.4)
GLOBULIN SER-MCNC: 2.5 G/DL (ref 1.5–4.5)
GLUCOSE SERPL-MCNC: 95 MG/DL (ref 65–99)
HCT VFR BLD AUTO: 39.1 % (ref 34–46.6)
HGB BLD-MCNC: 12.5 G/DL (ref 11.1–15.9)
IMM GRANULOCYTES # BLD: 0 X10E3/UL (ref 0–0.1)
IMM GRANULOCYTES NFR BLD: 0 %
LYMPHOCYTES # BLD AUTO: 1.5 X10E3/UL (ref 0.7–3.1)
LYMPHOCYTES NFR BLD AUTO: 42 %
MCH RBC QN AUTO: 28 PG (ref 26.6–33)
MCHC RBC AUTO-ENTMCNC: 32 G/DL (ref 31.5–35.7)
MCV RBC AUTO: 88 FL (ref 79–97)
MONOCYTES # BLD AUTO: 0.3 X10E3/UL (ref 0.1–0.9)
MONOCYTES NFR BLD AUTO: 8 %
NEUTROPHILS # BLD AUTO: 1.7 X10E3/UL (ref 1.4–7)
NEUTROPHILS NFR BLD AUTO: 49 %
PHOSPHATE SERPL-MCNC: 3.5 MG/DL (ref 3–4.3)
PLATELET # BLD AUTO: 249 X10E3/UL (ref 150–450)
POTASSIUM SERPL-SCNC: 4 MMOL/L (ref 3.5–5.2)
PROT SERPL-MCNC: 6.8 G/DL (ref 6–8.5)
RBC # BLD AUTO: 4.47 X10E6/UL (ref 3.77–5.28)
SL AMB EGFR AFRICAN AMERICAN: 76 ML/MIN/1.73
SL AMB EGFR NON AFRICAN AMERICAN: 66 ML/MIN/1.73
SODIUM SERPL-SCNC: 141 MMOL/L (ref 134–144)
T3 SERPL-MCNC: 99 NG/DL (ref 71–180)
TSH SERPL DL<=0.005 MIU/L-ACNC: 0.79 UIU/ML (ref 0.45–4.5)
WBC # BLD AUTO: 3.5 X10E3/UL (ref 3.4–10.8)

## 2022-02-07 DIAGNOSIS — E03.9 HYPOTHYROIDISM, UNSPECIFIED TYPE: ICD-10-CM

## 2022-02-07 RX ORDER — LEVOTHYROXINE SODIUM 88 UG/1
TABLET ORAL
Qty: 30 TABLET | Refills: 11 | Status: SHIPPED | OUTPATIENT
Start: 2022-02-07 | End: 2022-02-17 | Stop reason: SDUPTHER

## 2022-02-17 ENCOUNTER — OFFICE VISIT (OUTPATIENT)
Dept: ENDOCRINOLOGY | Facility: HOSPITAL | Age: 62
End: 2022-02-17
Payer: COMMERCIAL

## 2022-02-17 VITALS
WEIGHT: 164.4 LBS | DIASTOLIC BLOOD PRESSURE: 80 MMHG | SYSTOLIC BLOOD PRESSURE: 116 MMHG | BODY MASS INDEX: 30.25 KG/M2 | HEIGHT: 62 IN | HEART RATE: 76 BPM

## 2022-02-17 DIAGNOSIS — E03.9 ACQUIRED HYPOTHYROIDISM: Primary | ICD-10-CM

## 2022-02-17 DIAGNOSIS — Z86.39 H/O HYPERPARATHYROIDISM: ICD-10-CM

## 2022-02-17 DIAGNOSIS — E03.9 HYPOTHYROIDISM, UNSPECIFIED TYPE: ICD-10-CM

## 2022-02-17 DIAGNOSIS — E04.2 MULTIPLE THYROID NODULES: ICD-10-CM

## 2022-02-17 DIAGNOSIS — E89.2 STATUS POST PARATHYROIDECTOMY (HCC): ICD-10-CM

## 2022-02-17 PROCEDURE — 99214 OFFICE O/P EST MOD 30 MIN: CPT | Performed by: NURSE PRACTITIONER

## 2022-02-17 RX ORDER — LEVOTHYROXINE SODIUM 88 UG/1
88 TABLET ORAL DAILY
Qty: 90 TABLET | Refills: 3 | Status: SHIPPED | OUTPATIENT
Start: 2022-02-17

## 2022-02-17 NOTE — PATIENT INSTRUCTIONS
For now, continue levothyroxine at current dose  Contact the office with any change in symptoms  Obtain lab work prior to next office visit  Good luck with your new job !!!      Obtain thyroid ultrasound to continue surveillance  We will contact you with the results

## 2022-02-17 NOTE — PROGRESS NOTES
Jose Luis Yusuf 57 y o  female MRN: [id number]    Encounter: [id number]      Assessment/Plan     Assessment: This is a 57y o -year-old female with a history of hyperparathyroidism post parathyroid adenoma surgery, hypothyroidism and multiple thyroid nodules    Plan:  1  Hypothyroidism  Thyroid function lab work is normal   Overall, she feels well  She will continue the same levothyroxine 88 mcg daily  Check TSH and free T4 prior to next office visit        2  Multiple thyroid nodules  Her nodules are subcentimeter in size from her last ultrasound in 2018  It has been nearly 4 years  She will obtain another thyroid ultrasound to maintain some surveillance  She denies any anterior neck discomfort, dysphagia or dysphonia  We will contact her with results of her ultrasound        3  History of hyperparathyroidism post parathyroid adenoma removal   Most recent calcium is normal with a normal phosphorus  Check comprehensive metabolic panel and phosphorus level prior to next office visit        CC:   Hypothyroid, thyroid nodule, parathyroid follow-up    History of Present Illness     HPI:  57y o  year old female with history of Hypothyroidism with multiple small thyroid nodules and primary hyperparathyroidism post left parathyroid adenoma removal for follow-up visit      She was found to have primary hyperparathyroidism in 2014  A left parathyroid adenoma was discovered during the workup and she underwent surgical removal of that parathyroid adenoma with normalization of calcium  She denies polydipsia or polyuria  She denies confusion  She is always easily fatigued and has some constipation  Her most recent calcium level from January 12, 2022 is 9 6 with a phosphorus of 3 5      She has hypothyroidism diagnosed around 3492-3844 and takes levothyroxine 88 mcg daily  Her most recent TSH from January 12, 2022 is 0 793 with a normal T3 level of 99  She is always somewhat cold    She has continued unchanged fatigue  She denies tremors, heat intolerance, or weight changes  She tends to be on the constipated side  She can be anxious and stressed with work as she is starting a new job  She does have some sleeping irregularities because she only gets a short amount of time to sleep  She denies depression, diarrhea, or palpitations  She has unchanged brittle nails and some hair loss but no dry skin      She was found to have multiple small thyroid nodules on ultrasound  They were all subcentimeter and no biopsy was needed  Last thyroid ultrasound was 2018  There was no change in the nodules and no repeat thyroid  Ultrasounds need to be performed  She denies  Any anterior neck discomfort, dysphagia or dysphonia  Review of Systems   Constitutional: Positive for fatigue (tired - usually associated with work schedule)  Negative for chills and fever  HENT: Negative  Negative for trouble swallowing and voice change  Eyes: Negative  Negative for visual disturbance  Respiratory: Negative  Negative for chest tightness and shortness of breath  Cardiovascular: Negative  Negative for chest pain  Gastrointestinal: Negative  Negative for abdominal pain, constipation (improved with diet and water consumption), diarrhea and vomiting  GERD at times   Endocrine: Negative for cold intolerance, heat intolerance, polydipsia, polyphagia and polyuria  Genitourinary: Negative  Musculoskeletal: Negative  Skin: Negative  Allergic/Immunologic: Negative  Neurological: Negative  Negative for dizziness, syncope, light-headedness and headaches  Hematological: Negative  Psychiatric/Behavioral: Positive for sleep disturbance (at times)  All other systems reviewed and are negative        Historical Information   Past Medical History:   Diagnosis Date    GERD (gastroesophageal reflux disease)      Past Surgical History:   Procedure Laterality Date     SECTION      EYE SURGERY      left 3 times and right 1 time for lasar surggery in corners for dry eye    PARATHYROIDECTOMY / EXPLORATION OF PARATHYROIDS       Social History   Social History     Substance and Sexual Activity   Alcohol Use Yes    Comment: occasional     Social History     Substance and Sexual Activity   Drug Use No     Social History     Tobacco Use   Smoking Status Never Smoker   Smokeless Tobacco Never Used     Family History:   Family History   Problem Relation Age of Onset    Hypertension Mother     Stroke Father     Leukemia Brother     Hypertension Sister     No Known Problems Brother     No Known Problems Daughter     No Known Problems Sister     No Known Problems Sister     No Known Problems Sister     Depression Sister        Meds/Allergies   Current Outpatient Medications   Medication Sig Dispense Refill    bifidobacterium infantis (ALIGN) capsule Take 1 capsule by mouth daily prn      Cholecalciferol (VITAMIN D) 2000 units tablet Take 2,000 Units by mouth daily      levothyroxine 88 mcg tablet TAKE 1 TABLET BY MOUTH EVERY DAY 30 tablet 11    pantoprazole (PROTONIX) 40 mg tablet Take 40 mg by mouth daily      zolpidem (AMBIEN) 10 mg tablet Take 10 mg by mouth daily at bedtime as needed for sleep       No current facility-administered medications for this visit  Allergies   Allergen Reactions    Latex Itching     On contact     Milk-Related Compounds - Food Allergy Other (See Comments)     Acid reflux and abdominal  Bloating   Penicillins Other (See Comments)     Not given due to sister's bad reaction       Objective   Vitals: Blood pressure 116/80, pulse 76, height 5' 2" (1 575 m), weight 74 6 kg (164 lb 6 4 oz)  Physical Exam  Vitals reviewed  Constitutional:       Appearance: She is well-developed  HENT:      Head: Normocephalic and atraumatic  Eyes:      Conjunctiva/sclera: Conjunctivae normal       Pupils: Pupils are equal, round, and reactive to light        Comments: Wears glasses Cardiovascular:      Rate and Rhythm: Normal rate and regular rhythm  Heart sounds: Normal heart sounds  Pulmonary:      Effort: Pulmonary effort is normal       Breath sounds: Normal breath sounds  Abdominal:      General: Bowel sounds are normal       Palpations: Abdomen is soft  Musculoskeletal:         General: Normal range of motion  Cervical back: Normal range of motion and neck supple  Skin:     General: Skin is warm and dry  Neurological:      Mental Status: She is alert and oriented to person, place, and time  Psychiatric:         Behavior: Behavior normal          Thought Content: Thought content normal          Judgment: Judgment normal        Portions of the record may have been created with voice recognition software  Occasional wrong word or "sound a like" substitutions may have occurred due to the inherent limitations of voice recognition software  Read the chart carefully and recognize, using context, where substitutions have occurred

## 2022-02-18 ENCOUNTER — TELEPHONE (OUTPATIENT)
Dept: ADMINISTRATIVE | Facility: OTHER | Age: 62
End: 2022-02-18

## 2022-02-18 NOTE — TELEPHONE ENCOUNTER
----- Message from [facility] sent at 2/17/2022  9:22 AM EST -----  Regarding: CRC  02/17/22 9:22 AM    Hello, our patient Lolita Fowler has had a Colonoscopy performed by Dr Ubaldo Brooks  Her number is [phone]        Thank you,  [address] [place] [address] [facility]

## 2022-02-18 NOTE — LETTER
Procedure Request Form: Colonoscopy      Date Requested: 22  Patient: Zuly Karst  Patient : 1960   Referring Provider: David Puga, MD        Date of Procedure ______________________________       The above patient has informed us that they have completed their   most recent Colonoscopy at your facility  Please complete   this form and attach all corresponding procedure reports/results  Comments __________________________________________________________  ____________________________________________________________________  ____________________________________________________________________  ____________________________________________________________________    Facility Completing Procedure _________________________________________    Form Completed By (print name) _______________________________________      Signature __________________________________________________________      These reports are needed for  compliance  Please fax this completed form and a copy of the procedure report to our office located at [place] [address] as soon as possible to [phone] attention [place]: Phone [phone]    We thank you for your assistance in treating our mutual patient

## 2022-02-18 NOTE — TELEPHONE ENCOUNTER
Upon review of the In Basket request and the patient's chart, initial outreach has been made via fax, please see Contacts section for details       Thank you  Vincenzo oMore MA

## 2022-02-21 NOTE — TELEPHONE ENCOUNTER
Upon review of the In Basket request we were able to locate, review, and update the patient chart as requested for CRC: Colonoscopy  Any additional questions or concerns should be emailed to the Practice Liaisons via [email or web address] email, please do not reply via In Basket      Thank you  Rodger Shafer MA

## 2022-03-10 ENCOUNTER — TELEPHONE (OUTPATIENT)
Dept: ENDOCRINOLOGY | Facility: HOSPITAL | Age: 62
End: 2022-03-10

## 2022-03-10 NOTE — TELEPHONE ENCOUNTER
The thyroid ultrasound shows 4 thyroid nodules all too small to worry about other than observation over time with repeat ultrasound in 1 year

## 2022-03-10 NOTE — TELEPHONE ENCOUNTER
Patient called for the results of her thyroid US that she had done at [facility] a few weeks ago  Report was requested from [facility] 3/10/2022

## 2022-03-22 ENCOUNTER — TELEPHONE (OUTPATIENT)
Dept: ENDOCRINOLOGY | Facility: HOSPITAL | Age: 62
End: 2022-03-22

## 2022-03-22 NOTE — TELEPHONE ENCOUNTER
Received voicemail from patient  She had questions about her follow up ultrasound and noted a concern of dizziness  Left message to call back

## 2022-03-23 DIAGNOSIS — E04.2 MULTIPLE THYROID NODULES: ICD-10-CM

## 2022-03-23 DIAGNOSIS — E03.9 ACQUIRED HYPOTHYROIDISM: Primary | ICD-10-CM

## 2022-03-23 NOTE — TELEPHONE ENCOUNTER
Patient called back  She just wants her US order mailed to her  She thinks that she may be dehydrated so she is going to increase her fluid intake and monitor the dizziness  Could you order the US for her?

## 2022-10-26 ENCOUNTER — APPOINTMENT (OUTPATIENT)
Dept: RADIOLOGY | Facility: CLINIC | Age: 62
End: 2022-10-26

## 2022-10-26 ENCOUNTER — APPOINTMENT (OUTPATIENT)
Dept: URGENT CARE | Facility: CLINIC | Age: 62
End: 2022-10-26

## 2022-10-26 DIAGNOSIS — Z02.1 PRE-EMPLOYMENT HEALTH SCREENING EXAMINATION: Primary | ICD-10-CM

## 2022-10-26 DIAGNOSIS — Z02.1 PRE-EMPLOYMENT HEALTH SCREENING EXAMINATION: ICD-10-CM

## 2023-01-11 LAB
ALBUMIN SERPL-MCNC: 4.3 G/DL (ref 3.8–4.8)
ALBUMIN/GLOB SERPL: 2 {RATIO} (ref 1.2–2.2)
ALP SERPL-CCNC: 91 IU/L (ref 44–121)
ALT SERPL-CCNC: 16 IU/L (ref 0–32)
AST SERPL-CCNC: 21 IU/L (ref 0–40)
BILIRUB SERPL-MCNC: <0.2 MG/DL (ref 0–1.2)
BUN SERPL-MCNC: 12 MG/DL (ref 8–27)
BUN/CREAT SERPL: 16 (ref 12–28)
CALCIUM SERPL-MCNC: 9.3 MG/DL (ref 8.7–10.3)
CHLORIDE SERPL-SCNC: 105 MMOL/L (ref 96–106)
CO2 SERPL-SCNC: 24 MMOL/L (ref 20–29)
CREAT SERPL-MCNC: 0.74 MG/DL (ref 0.57–1)
EGFR: 91 ML/MIN/1.73
GLOBULIN SER-MCNC: 2.2 G/DL (ref 1.5–4.5)
GLUCOSE SERPL-MCNC: 91 MG/DL (ref 70–99)
PHOSPHATE SERPL-MCNC: 3.4 MG/DL (ref 3–4.3)
POTASSIUM SERPL-SCNC: 4.5 MMOL/L (ref 3.5–5.2)
PROT SERPL-MCNC: 6.5 G/DL (ref 6–8.5)
SODIUM SERPL-SCNC: 143 MMOL/L (ref 134–144)
T3 SERPL-MCNC: 89 NG/DL (ref 71–180)
T4 FREE SERPL-MCNC: 1.48 NG/DL (ref 0.82–1.77)
TSH SERPL DL<=0.005 MIU/L-ACNC: 1.12 UIU/ML (ref 0.45–4.5)

## 2023-02-07 ENCOUNTER — OFFICE VISIT (OUTPATIENT)
Dept: ENDOCRINOLOGY | Facility: HOSPITAL | Age: 63
End: 2023-02-07

## 2023-02-07 VITALS
HEART RATE: 62 BPM | SYSTOLIC BLOOD PRESSURE: 120 MMHG | BODY MASS INDEX: 30.73 KG/M2 | HEIGHT: 62 IN | WEIGHT: 167 LBS | DIASTOLIC BLOOD PRESSURE: 82 MMHG

## 2023-02-07 DIAGNOSIS — E89.2 STATUS POST PARATHYROIDECTOMY (HCC): ICD-10-CM

## 2023-02-07 DIAGNOSIS — E03.9 ACQUIRED HYPOTHYROIDISM: Primary | ICD-10-CM

## 2023-02-07 DIAGNOSIS — E04.2 MULTIPLE THYROID NODULES: ICD-10-CM

## 2023-02-07 DIAGNOSIS — E03.9 HYPOTHYROIDISM, UNSPECIFIED TYPE: ICD-10-CM

## 2023-02-07 DIAGNOSIS — Z86.39 H/O HYPERPARATHYROIDISM: ICD-10-CM

## 2023-02-07 RX ORDER — LEVOTHYROXINE SODIUM 88 UG/1
88 TABLET ORAL DAILY
Qty: 90 TABLET | Refills: 3 | Status: SHIPPED | OUTPATIENT
Start: 2023-02-07

## 2023-02-07 NOTE — PATIENT INSTRUCTIONS
The blood work is all normal     Continue the same levothyroxine 88 mcg daily  The thyroid ultrasound is stable with no change in nodule sizes  Follow up in 1 year with blood work

## 2023-02-07 NOTE — PROGRESS NOTES
2/8/2023    Assessment/Plan      Diagnoses and all orders for this visit:    Acquired hypothyroidism  -     Comprehensive metabolic panel Lab Collect; Future  -     CBC and differential Lab Collect; Future  -     Phosphorus Lab Collect; Future  -     T4, free Lab Collect; Future  -     TSH, 3rd generation Lab Collect; Future    Multiple thyroid nodules  -     Comprehensive metabolic panel Lab Collect; Future  -     CBC and differential Lab Collect; Future  -     Phosphorus Lab Collect; Future  -     T4, free Lab Collect; Future  -     TSH, 3rd generation Lab Collect; Future    Status post parathyroidectomy [facility])  -     Comprehensive metabolic panel Lab Collect; Future  -     CBC and differential Lab Collect; Future  -     Phosphorus Lab Collect; Future  -     T4, free Lab Collect; Future  -     TSH, 3rd generation Lab Collect; Future    H/O hyperparathyroidism  -     Comprehensive metabolic panel Lab Collect; Future  -     CBC and differential Lab Collect; Future  -     Phosphorus Lab Collect; Future  -     T4, free Lab Collect; Future  -     TSH, 3rd generation Lab Collect; Future    Hypothyroidism, unspecified type  -     levothyroxine 88 mcg tablet; Take 1 tablet (88 mcg total) by mouth daily        Assessment/Plan:  1  Hypothyroidism  Most recent thyroid function studies were normal   She is biochemically euthyroid  She will continue the same levothyroxine 88 mcg daily  2   Multiple thyroid nodules  Thyroid ultrasound demonstrates stable size thyroid nodules  Thyroid ultrasound will be repeated in 4 to 5 years if needed  3   History of hyperparathyroidism post parathyroid adenoma removal   Most recent calcium levels were normal   It does not appear as if her hyperparathyroidism has recurred  I will follow her calcium levels over time  I have asked her to follow-up in 1 year with preceding TSH, free T4, CMP, CBC, and phosphorus        CC: Hypothyroid, thyroid nodule, parathyroid follow-up    History of Present Illness     HPI: Del Boyle is a 60y o  year old female with history of hypothyroidism with multiple small thyroid nodules and primary hyperparathyroidism post left parathyroid adenoma removal for follow-up visit  She was found to have primary hyperparathyroidism in 2014  A left parathyroid adenoma was discovered during the workup and she underwent surgical removal of that parathyroid adenoma with normalization of calcium  She unfortunately had continued fatigue postoperatively and was found to have hypothyroidism in 2014 to 2015 and was started on thyroid hormone  Of note, DEXA scan was normal during the workup of her parathyroid disease  She denies polyuria or polydipsia  She has nocturia 3 times a night  She tries to drink at least 32 ounces of water a day  She does have some fatigue and constipation  She has no mental confusion  She has hypothyroidism diagnosed around 1410-2718 and takes levothyroxine 88 mcg daily  She denies heat or cold intolerance, palpitation, tremors, or weight changes  She is always fatigued  She does have insomnia and wakes frequently at night  She has some anxiety with job changes  She has some constipation  She denies diarrhea or depression  She has dry skin and hair loss but no brittle nails  She denies diplopia  She denies compressive thyroid symptoms or difficulties with swallowing  She was found to have multiple small thyroid nodules on ultrasound  They were all subcentimeter and no biopsy was needed  Last thyroid ultrasound was 2018  There was no change in the nodules and no repeat thyroid  Ultrasounds need to be performed  Review of Systems   Constitutional: Positive for fatigue  Negative for unexpected weight change  Always fatigued  HENT: Negative for trouble swallowing  Eyes: Negative for visual disturbance  Wears glasses  No diplopia  Respiratory: Negative for chest tightness and shortness of breath  Cardiovascular: Negative for chest pain, palpitations and leg swelling  Gastrointestinal: Positive for constipation  Negative for abdominal pain, diarrhea and nausea  Still some constipation  Endocrine: Negative for cold intolerance, heat intolerance, polydipsia and polyuria  Drinking 32 ounces water at least  Nocturia 3 times a night  Skin: Negative for rash  Hs dry skin  Has hair loss  Using steroid cream for scalp and hair loss  No brittle nails  Neurological: Positive for light-headedness  Negative for dizziness, tremors, numbness and headaches  Lightheaded with not drinking enough  Psychiatric/Behavioral: Positive for sleep disturbance  Negative for dysphoric mood  The patient is nervous/anxious  Had anxiety until changed jobs  wakes frequently at night          Historical Information   Past Medical History:   Diagnosis Date   • GERD (gastroesophageal reflux disease)      Past Surgical History:   Procedure Laterality Date   •  SECTION     • EYE SURGERY      left 3 times and right 1 time for lasar surggery in corners for dry eye   • PARATHYROIDECTOMY / EXPLORATION OF PARATHYROIDS       Social History   Social History     Substance and Sexual Activity   Alcohol Use Yes    Comment: occasional     Social History     Substance and Sexual Activity   Drug Use No     Social History     Tobacco Use   Smoking Status Never   Smokeless Tobacco Never     Family History:   Family History   Problem Relation Age of Onset   • Hypertension Mother    • Stroke Father    • Leukemia Brother    • Hypertension Sister    • No Known Problems Brother    • No Known Problems Daughter    • No Known Problems Sister    • No Known Problems Sister    • No Known Problems Sister    • Depression Sister        Meds/Allergies   Current Outpatient Medications   Medication Sig Dispense Refill   • bifidobacterium infantis (ALIGN) capsule Take 1 capsule by mouth daily prn     • cholecalciferol (VITAMIN D3) 25 mcg (1,000 units) tablet Take 1,000 Units by mouth daily       • levothyroxine 88 mcg tablet Take 1 tablet (88 mcg total) by mouth daily 90 tablet 3   • pantoprazole (PROTONIX) 40 mg tablet Take 40 mg by mouth daily     • zolpidem (AMBIEN) 10 mg tablet Take 10 mg by mouth daily at bedtime as needed for sleep       No current facility-administered medications for this visit  Allergies   Allergen Reactions   • Latex Itching     On contact    • Milk-Related Compounds - Food Allergy Other (See Comments)     Acid reflux and abdominal  Bloating  • Penicillins Other (See Comments)     Not given due to sister's bad reaction       Objective   Vitals: Blood pressure 120/82, pulse 62, height 5' 2" (1 575 m), weight 75 8 kg (167 lb)  Invasive Devices     None                 Physical Exam  Vitals reviewed  Constitutional:       Appearance: Normal appearance  She is well-developed  HENT:      Head: Normocephalic and atraumatic  Eyes:      Extraocular Movements: Extraocular movements intact  Conjunctiva/sclera: Conjunctivae normal       Comments: No lid lag, stare, proptosis, or periorbital edema  Neck:      Thyroid: No thyromegaly  Vascular: No carotid bruit  Comments: Healed anterior neck scar  Thyroid irregular and feel without discrete nodules palpable  Cardiovascular:      Rate and Rhythm: Normal rate and regular rhythm  Heart sounds: Normal heart sounds  No murmur heard  Pulmonary:      Effort: Pulmonary effort is normal       Breath sounds: Normal breath sounds  No wheezing  Abdominal:      Palpations: Abdomen is soft  Musculoskeletal:         General: No deformity  Normal range of motion  Cervical back: Normal range of motion and neck supple  Right lower leg: No edema  Left lower leg: No edema  Comments: No tremor of the outstretched hands  No spinous process tenderness  No CVA tenderness     Lymphadenopathy:      Cervical: No cervical adenopathy  Skin:     General: Skin is warm and dry  Findings: No rash  Neurological:      Mental Status: She is alert and oriented to person, place, and time  Deep Tendon Reflexes: Reflexes are normal and symmetric  Comments: Patellar deep tendon reflexes normal          The history was obtained from the review of the chart and from the patient  Lab Results:      Blood work done on 1/10/2023 showed a CMP with a glucose of 91 fasting, calcium 9 3 with an albumin of 4 3  Phosphorus is 3 4  Lab Results   Component Value Date    CREATININE 0 74 01/10/2023    CREATININE 0 93 01/12/2022    CREATININE 0 90 01/08/2021    BUN 12 01/10/2023    K 4 5 01/10/2023     01/10/2023    CO2 24 01/10/2023     eGFR   Date Value Ref Range Status   01/10/2023 91 >59 mL/min/1 73 Final         Lab Results   Component Value Date    ALT 16 01/10/2023    AST 21 01/10/2023       Lab Results   Component Value Date    TSH 1 120 01/10/2023    FREET4 1 48 01/10/2023     T3 total is 89          Future Appointments   Date Time Provider Francisco Temple   2/7/2024 10:20 AM Korina Cruz, [facility]

## 2024-01-11 ENCOUNTER — TELEPHONE (OUTPATIENT)
Dept: ENDOCRINOLOGY | Facility: HOSPITAL | Age: 64
End: 2024-01-11

## 2024-01-11 NOTE — TELEPHONE ENCOUNTER
Last ultrasound in January 2023 showed stable small nodules.  I was not going to repeat the ultrasound for 2 years which would be January 2025 unless she felt something different or if something she felt was changing in her neck.

## 2024-01-12 LAB
ALBUMIN SERPL-MCNC: 4.4 G/DL (ref 3.9–4.9)
ALBUMIN/GLOB SERPL: 1.8 {RATIO} (ref 1.2–2.2)
ALP SERPL-CCNC: 93 IU/L (ref 44–121)
ALT SERPL-CCNC: 16 IU/L (ref 0–32)
AST SERPL-CCNC: 19 IU/L (ref 0–40)
BASOPHILS # BLD AUTO: 0 X10E3/UL (ref 0–0.2)
BASOPHILS NFR BLD AUTO: 1 %
BILIRUB SERPL-MCNC: 0.3 MG/DL (ref 0–1.2)
BUN SERPL-MCNC: 8 MG/DL (ref 8–27)
BUN/CREAT SERPL: 8 (ref 12–28)
CALCIUM SERPL-MCNC: 9.5 MG/DL (ref 8.7–10.3)
CHLORIDE SERPL-SCNC: 104 MMOL/L (ref 96–106)
CO2 SERPL-SCNC: 25 MMOL/L (ref 20–29)
CREAT SERPL-MCNC: 0.97 MG/DL (ref 0.57–1)
EGFR: 65 ML/MIN/1.73
EOSINOPHIL # BLD AUTO: 0.1 X10E3/UL (ref 0–0.4)
EOSINOPHIL NFR BLD AUTO: 4 %
ERYTHROCYTE [DISTWIDTH] IN BLOOD BY AUTOMATED COUNT: 13.7 % (ref 11.7–15.4)
GLOBULIN SER-MCNC: 2.4 G/DL (ref 1.5–4.5)
GLUCOSE SERPL-MCNC: 90 MG/DL (ref 70–99)
HCT VFR BLD AUTO: 39.6 % (ref 34–46.6)
HGB BLD-MCNC: 12.5 G/DL (ref 11.1–15.9)
IMM GRANULOCYTES # BLD: 0 X10E3/UL (ref 0–0.1)
IMM GRANULOCYTES NFR BLD: 0 %
LYMPHOCYTES # BLD AUTO: 1.4 X10E3/UL (ref 0.7–3.1)
LYMPHOCYTES NFR BLD AUTO: 41 %
MCH RBC QN AUTO: 27.5 PG (ref 26.6–33)
MCHC RBC AUTO-ENTMCNC: 31.6 G/DL (ref 31.5–35.7)
MCV RBC AUTO: 87 FL (ref 79–97)
MONOCYTES # BLD AUTO: 0.2 X10E3/UL (ref 0.1–0.9)
MONOCYTES NFR BLD AUTO: 6 %
NEUTROPHILS # BLD AUTO: 1.6 X10E3/UL (ref 1.4–7)
NEUTROPHILS NFR BLD AUTO: 48 %
PHOSPHATE SERPL-MCNC: 3.8 MG/DL (ref 3–4.3)
PLATELET # BLD AUTO: 238 X10E3/UL (ref 150–450)
POTASSIUM SERPL-SCNC: 4 MMOL/L (ref 3.5–5.2)
PROT SERPL-MCNC: 6.8 G/DL (ref 6–8.5)
RBC # BLD AUTO: 4.55 X10E6/UL (ref 3.77–5.28)
SODIUM SERPL-SCNC: 141 MMOL/L (ref 134–144)
T4 FREE SERPL-MCNC: 1.59 NG/DL (ref 0.82–1.77)
TSH SERPL DL<=0.005 MIU/L-ACNC: 1.59 UIU/ML (ref 0.45–4.5)
WBC # BLD AUTO: 3.3 X10E3/UL (ref 3.4–10.8)

## 2024-01-23 DIAGNOSIS — E03.9 HYPOTHYROIDISM, UNSPECIFIED TYPE: ICD-10-CM

## 2024-01-23 RX ORDER — LEVOTHYROXINE SODIUM 88 UG/1
88 TABLET ORAL DAILY
Qty: 90 TABLET | Refills: 3 | Status: SHIPPED | OUTPATIENT
Start: 2024-01-23